# Patient Record
Sex: MALE | Race: WHITE | NOT HISPANIC OR LATINO | URBAN - METROPOLITAN AREA
[De-identification: names, ages, dates, MRNs, and addresses within clinical notes are randomized per-mention and may not be internally consistent; named-entity substitution may affect disease eponyms.]

---

## 2017-01-01 ENCOUNTER — TRANSFERRED RECORDS (OUTPATIENT)
Dept: HEALTH INFORMATION MANAGEMENT | Facility: CLINIC | Age: 62
End: 2017-01-01

## 2017-01-01 ENCOUNTER — HOSPITAL ENCOUNTER (OUTPATIENT)
Dept: SPEECH THERAPY | Facility: CLINIC | Age: 62
Setting detail: THERAPIES SERIES
End: 2017-02-14
Attending: PSYCHIATRY & NEUROLOGY
Payer: MEDICARE

## 2017-01-01 ENCOUNTER — HOSPITAL ENCOUNTER (OUTPATIENT)
Dept: GENERAL RADIOLOGY | Facility: CLINIC | Age: 62
Discharge: HOME | End: 2017-02-14
Attending: PSYCHIATRY & NEUROLOGY | Admitting: PSYCHIATRY & NEUROLOGY
Payer: MEDICARE

## 2017-01-01 ENCOUNTER — TELEPHONE (OUTPATIENT)
Dept: FAMILY MEDICINE | Facility: CLINIC | Age: 62
End: 2017-01-01

## 2017-01-01 ENCOUNTER — OFFICE VISIT (OUTPATIENT)
Dept: FAMILY MEDICINE | Facility: CLINIC | Age: 62
End: 2017-01-01
Payer: COMMERCIAL

## 2017-01-01 ENCOUNTER — OFFICE VISIT (OUTPATIENT)
Dept: NEUROLOGY | Facility: CLINIC | Age: 62
End: 2017-01-01
Payer: COMMERCIAL

## 2017-01-01 VITALS
OXYGEN SATURATION: 92 % | DIASTOLIC BLOOD PRESSURE: 60 MMHG | HEART RATE: 89 BPM | BODY MASS INDEX: 32.51 KG/M2 | RESPIRATION RATE: 16 BRPM | TEMPERATURE: 96.6 F | WEIGHT: 233 LBS | SYSTOLIC BLOOD PRESSURE: 102 MMHG

## 2017-01-01 VITALS
HEART RATE: 106 BPM | SYSTOLIC BLOOD PRESSURE: 123 MMHG | WEIGHT: 228 LBS | DIASTOLIC BLOOD PRESSURE: 66 MMHG | HEIGHT: 71 IN | BODY MASS INDEX: 31.92 KG/M2

## 2017-01-01 DIAGNOSIS — Z85.118 HISTORY OF LUNG CANCER: ICD-10-CM

## 2017-01-01 DIAGNOSIS — E11.3393 TYPE 2 DIABETES MELLITUS WITH BOTH EYES AFFECTED BY MODERATE NONPROLIFERATIVE RETINOPATHY WITHOUT MACULAR EDEMA, WITHOUT LONG-TERM CURRENT USE OF INSULIN (H): ICD-10-CM

## 2017-01-01 DIAGNOSIS — G47.01 INSOMNIA DUE TO MEDICAL CONDITION: ICD-10-CM

## 2017-01-01 DIAGNOSIS — G70.80: ICD-10-CM

## 2017-01-01 DIAGNOSIS — N18.1 CKD (CHRONIC KIDNEY DISEASE) STAGE 1, GFR 90 ML/MIN OR GREATER: ICD-10-CM

## 2017-01-01 DIAGNOSIS — R13.10 DYSPHAGIA, UNSPECIFIED TYPE: ICD-10-CM

## 2017-01-01 DIAGNOSIS — G70.01 MYASTHENIA GRAVIS WITH EXACERBATION (H): Primary | ICD-10-CM

## 2017-01-01 DIAGNOSIS — G70.80: Primary | ICD-10-CM

## 2017-01-01 DIAGNOSIS — E11.42 DIABETIC PERIPHERAL NEUROPATHY (H): ICD-10-CM

## 2017-01-01 DIAGNOSIS — R91.8 HILAR MASS: ICD-10-CM

## 2017-01-01 DIAGNOSIS — J34.89 NASAL DRAINAGE: Primary | ICD-10-CM

## 2017-01-01 DIAGNOSIS — R80.9 MICROALBUMINURIA: ICD-10-CM

## 2017-01-01 DIAGNOSIS — E11.29 TYPE 2 DIABETES, CONTROLLED, WITH RENAL MANIFESTATION (H): ICD-10-CM

## 2017-01-01 DIAGNOSIS — R80.9 PROTEINURIA: ICD-10-CM

## 2017-01-01 DIAGNOSIS — G70.80 LAMBERT-EATON MYASTHENIC SYNDROME (H): Primary | ICD-10-CM

## 2017-01-01 DIAGNOSIS — C34.91 NON-SMALL CELL CANCER OF RIGHT LUNG (H): Primary | ICD-10-CM

## 2017-01-01 LAB — HBA1C MFR BLD: 6.2 % (ref 4.3–6)

## 2017-01-01 PROCEDURE — 40000211 ZZHC STATISTIC SLP  DEPARTMENT VISIT: Performed by: SPEECH-LANGUAGE PATHOLOGIST

## 2017-01-01 PROCEDURE — 83036 HEMOGLOBIN GLYCOSYLATED A1C: CPT | Performed by: INTERNAL MEDICINE

## 2017-01-01 PROCEDURE — G8997 SWALLOW GOAL STATUS: HCPCS | Mod: GN,CM | Performed by: SPEECH-LANGUAGE PATHOLOGIST

## 2017-01-01 PROCEDURE — 99205 OFFICE O/P NEW HI 60 MIN: CPT | Performed by: PSYCHIATRY & NEUROLOGY

## 2017-01-01 PROCEDURE — 92611 MOTION FLUOROSCOPY/SWALLOW: CPT | Mod: GN | Performed by: SPEECH-LANGUAGE PATHOLOGIST

## 2017-01-01 PROCEDURE — G8996 SWALLOW CURRENT STATUS: HCPCS | Mod: GN,CM | Performed by: SPEECH-LANGUAGE PATHOLOGIST

## 2017-01-01 PROCEDURE — 92526 ORAL FUNCTION THERAPY: CPT | Mod: GN | Performed by: SPEECH-LANGUAGE PATHOLOGIST

## 2017-01-01 PROCEDURE — 36415 COLL VENOUS BLD VENIPUNCTURE: CPT | Performed by: INTERNAL MEDICINE

## 2017-01-01 PROCEDURE — G8998 SWALLOW D/C STATUS: HCPCS | Mod: GN,CM | Performed by: SPEECH-LANGUAGE PATHOLOGIST

## 2017-01-01 PROCEDURE — 99214 OFFICE O/P EST MOD 30 MIN: CPT | Performed by: INTERNAL MEDICINE

## 2017-01-01 RX ORDER — DIPHENHYDRAMINE HCL 25 MG
25-50 TABLET ORAL EVERY 6 HOURS PRN
Qty: 120 TABLET | Refills: 1 | Status: SHIPPED | OUTPATIENT
Start: 2017-01-01

## 2017-01-01 RX ORDER — PYRIDOSTIGMINE BROMIDE 60 MG/1
TABLET ORAL
Qty: 30 TABLET | Refills: 1 | OUTPATIENT
Start: 2017-01-01

## 2017-01-01 RX ORDER — PYRIDOSTIGMINE BROMIDE 60 MG/1
TABLET ORAL
Qty: 90 TABLET | Refills: 0 | Status: SHIPPED | OUTPATIENT
Start: 2017-01-01 | End: 2017-01-01

## 2017-01-01 RX ORDER — ZOLPIDEM TARTRATE 5 MG/1
5 TABLET ORAL
Qty: 30 TABLET | Refills: 5 | Status: SHIPPED | OUTPATIENT
Start: 2017-01-01

## 2017-01-01 RX ORDER — PYRIDOSTIGMINE BROMIDE 60 MG/1
60 TABLET ORAL 3 TIMES DAILY
Qty: 90 TABLET | Refills: 0 | Status: SHIPPED | OUTPATIENT
Start: 2017-01-01 | End: 2017-01-01

## 2017-01-01 RX ORDER — PYRIDOSTIGMINE BROMIDE 60 MG/1
TABLET ORAL
Qty: 90 TABLET | Refills: 0 | Status: SHIPPED | OUTPATIENT
Start: 2017-01-01

## 2017-01-01 RX ORDER — LISINOPRIL 2.5 MG/1
2.5 TABLET ORAL DAILY
Qty: 90 TABLET | Refills: 1 | Status: SHIPPED | OUTPATIENT
Start: 2017-01-01

## 2017-01-01 RX ORDER — PYRIDOSTIGMINE BROMIDE 60 MG/1
TABLET ORAL
Qty: 90 TABLET | Refills: 0 | OUTPATIENT
Start: 2017-01-01

## 2017-01-01 ASSESSMENT — PAIN SCALES - GENERAL: PAINLEVEL: NO PAIN (0)

## 2017-01-05 NOTE — TELEPHONE ENCOUNTER
Pyridostigmine 60mg      Last Written Prescription Date:  ?  Last Fill Quantity: ?,   # refills: ?  Last Office Visit with [facility], [facility] or  Health prescribing provider: 04/19/2016  Future Office visit:       Routing refill request to provider for review/approval because:  Drug not on the [facility], [facility] or  [employer] refill protocol or controlled substance    Silvina Geiger MA

## 2017-01-06 NOTE — TELEPHONE ENCOUNTER
Per [place] ([place]) patient was not seen in the ER or inpatient.  Patient was seen at [place] in December for a radiology appointment only that was ordered by [facility].  All [facility] records are scanned into Epic.  Halina Preciado,

## 2017-01-06 NOTE — TELEPHONE ENCOUNTER
Thank you for the update  . Will discuss all issues at patients appointment this Monday     Mandeep Pardo MD

## 2017-01-06 NOTE — TELEPHONE ENCOUNTER
Reason for Call:  Other appointment    Detailed comments: Patient's sister Adela states patient is suffering from myasthenia gravis and diabetes. Adela also states patient was treated @ [facility] via ER and was hospitalized from 12/10/2016 and discharged 12/14/2016. Adela states patient was prescribed Pyridostigmine 60mg however patient has run out of the medication and called [place] for a refill but was instructed to call his primary provider instead so patientlater requested the medication at  but it hasn't been filled. Adela states patient in incapable of explaining his condition fully but it is grave.  Patient has an upcoming appointment scheduled for 01/09/2017 however Adela is concerned that the medication controls patient's ability to swallow and without it he cannot.  A Adela further states patient has been out of meds for almost 24 hours.  Per Adela would like for patient to be added to provider's earliest schedule if possible and have the medication refilled otherwise patient may go into diabetic shock and become incapable of swallowing.    Phone Number Patient can be reached at: Other phone number:  [phone]    Best Time: ASAP    Can we leave a detailed message on this number? YES    Call taken on 1/5/2017 at 6:06 PM by Juana Griffith

## 2017-01-06 NOTE — TELEPHONE ENCOUNTER
Duplicate. Already filled through sperate encounter from 1/5/17    Hedy Wang RN   [facility]- Float Nurse/AntiCoag

## 2017-01-06 NOTE — TELEPHONE ENCOUNTER
I was sent this telephone message as an urgent message. I called and spoke with Adela for a solid 20 minutes. Her brother Al Mirza is in a grave condition it sounds. He was at [facility] and then [facility] ?     [facility] and [facility] discharge summaries / documentation will need to be obtained as soon as possible     I do not understand the full extent of things. 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 Also depression has really emerged and ; we are to consider medication .    The upshot of all of this discussion is    1. Patient does not have critically unstable signs or symptoms  And they don't want to call 911 at this point. Patient refuses re-hospitalization.   2. They urgently want me to send in a refill for the Mestinon (Pyridostigmine) and lisinopril and I did this . He's otherwise current with the prescriptions   3 . We will see patient at his appointment on Monday 1//8/2017.  4. If the situation is unstable sister agrees to call 911 / emergency medical personnel.    Mandeep Pardo MD

## 2017-01-09 NOTE — MR AVS SNAPSHOT
After Visit Summary   1/9/2017    Al Mirza    MRN: [id number]           Patient Information     Date Of Birth          1955        Visit Information        Provider Department      1/9/2017 1:50 PM Mandeep Pardo MD [facility]        Today's Diagnoses     Eaton-Lambert myasthenic syndrome (H)    -  1     Hilar mass         Insomnia due to medical condition         History of lung cancer         Type 2 diabetes mellitus with both eyes affected by moderate nonproliferative retinopathy without macular edema, without long-term current use of insulin           Care Instructions    [facility]    If you have any questions regarding to your visit please contact your care team:     Team Pink:   Clinic Hours Telephone Number   Internal Medicine:  Dr. Sweetie Alejo NP       7am-7pm  Monday - Thursday   7am-5pm  Fridays  [phone]  (Appointment scheduling available 24/7)    Questions about your visit?  Team Line  [phone]   Urgent Care - [place] - 11am-9pm Monday-Friday Saturday-Sunday- 9am-5pm   [place] - 5pm-9pm Monday-Friday Saturday-Sunday- 9am-5pm  [phone] - [place]   [phone] - [place]       What options do I have for visits at the clinic other than the traditional office visit?  To expand how we care for you, many of our providers are utilizing electronic visits (e-visits) and telephone visits, when medically appropriate, for interactions with their patients rather than a visit in the clinic.   We also offer nurse visits for many medical concerns. Just like any other service, we will bill your insurance company for this type of visit based on time spent on the phone with your provider. Not all insurance companies cover these visits. Please check with your medical insurance if this type of visit is covered. You will be responsible for any charges that are not paid by your  insurance.      E-visits via [employer]:  generally incur a $35.00 fee.  Telephone visits:  Time spent on the phone: *charged based on time that is spent on the phone in increments of 10 minutes. Estimated cost:   5-10 mins $30.00   11-20 mins. $59.00   21-30 mins. $85.00   Use [employer] (secure email communication and access to your chart) to send your primary care provider a message or make an appointment. Ask someone on your Team how to sign up for [employer].    For a Price Quote for your services, please call our [employer] Line at [phone].    As always, Thank you for trusting us with your health care needs!    Discharged by Katya MARTIN CMA ([facility])          Follow-ups after your visit        Additional Services     NEUROLOGY ADULT REFERRAL       Your provider has referred you to: [facility]: Sonal Webster [phone]   [email or web address]    Reason for Referral: Consult    Please be aware that coverage of these services is subject to the terms and limitations of your health insurance plan.  Call member services at your health plan with any benefit or coverage questions.      Please bring the following with you to your appointment:    (1) Any X-Rays, CTs or MRIs which have been performed.  Contact the facility where they were done to arrange for  prior to your scheduled appointment.    (2) List of current medications  (3) This referral request   (4) Any documents/labs given to you for this referral                  Who to contact     If you have questions or need follow up information about today's clinic visit or your schedule please contact [facility] directly at [phone].  Normal or non-critical lab and imaging results will be communicated to you by MyChart, letter or phone within 4 business days after the clinic has received the results. If you do not hear from us within 7 days, please contact the clinic through MyChart or phone. If you have a  "critical or abnormal lab result, we will notify you by phone as soon as possible.  Submit refill requests through [employer] or call your pharmacy and they will forward the refill request to us. Please allow 3 business days for your refill to be completed.          Additional Information About Your Visit        [employer] Information     [employer] lets you send messages to your doctor, view your test results, renew your prescriptions, schedule appointments and more. To sign up, go to [email or web address] . Click on \"Log in\" on the left side of the screen, which will take you to the Welcome page. Then click on \"Sign up Now\" on the right side of the page.     You will be asked to enter the access code listed below, as well as some personal information. Please follow the directions to create your username and password.     Your access code is: [email or web address]  Expires: 2017  2:37 PM     Your access code will  in 90 days. If you need help or a new code, please call your [place] clinic or [phone].        Care EveryWhere ID     This is your Care EveryWhere ID. This could be used by other organizations to access your [place] medical records  [id number]        Your Vitals Were     Pulse Temperature Respirations Pulse Oximetry          89 96.6  F (35.9  C) (Oral) 16 92%         Blood Pressure from Last 3 Encounters:   17 102/60   16 128/82   07/27/15 136/77    Weight from Last 3 Encounters:   17 233 lb (105.688 kg)   16 248 lb 3.2 oz (112.583 kg)   07/27/15 255 lb (115.667 kg)              We Performed the Following     Hemoglobin A1c     NEUROLOGY ADULT REFERRAL          Today's Medication Changes          These changes are accurate as of: 17  2:37 PM.  If you have any questions, ask your nurse or doctor.               Start taking these medicines.        Dose/Directions    zolpidem 5 MG tablet   Commonly known as:  AMBIEN   Used for:  Insomnia due to medical condition   Started by:  " Mandeep Pardo MD        Dose:  5 mg   Take 1 tablet (5 mg) by mouth nightly as needed for sleep   Quantity:  30 tablet   Refills:  5            Where to get your medicines      Some of these will need a paper prescription and others can be bought over the counter.  Ask your nurse if you have questions.     Bring a paper prescription for each of these medications    - zolpidem 5 MG tablet             Primary Care Provider Office Phone # Fax #    Mandeep Pardo [phone]       [address] [address]        Thank you!     Thank you for choosing [facility]  for your care. Our goal is always to provide you with excellent care. Hearing back from our patients is one way we can continue to improve our services. Please take a few minutes to complete the written survey that you may receive in the mail after your visit with us. Thank you!             Your Updated Medication List - Protect others around you: Learn how to safely use, store and throw away your medicines at www.disposemymeds.org.          This list is accurate as of: 1/9/17  2:37 PM.  Always use your most recent med list.                   Brand Name Dispense Instructions for use    albuterol 108 (90 BASE) MCG/ACT Inhaler    PROAIR HFA/PROVENTIL HFA/VENTOLIN HFA     Inhale 2 puffs into the lungs every 6 hours       aspirin 81 MG tablet     1 tablet    Take 1 tablet by mouth daily.       blood glucose calibration solution     1 Bottle    1 Bottle by In Vitro route See Admin Instructions       blood glucose monitoring lancets     102 each    Check home blood glucose monitering 2 times a day       blood glucose monitoring test strip    no brand specified    3 Month    Check three times a day home blood glucose monitoring. Due to fluctuating blood glucose readings , and some hypoglycemia       budesonide-formoterol 80-4.5 MCG/ACT Inhaler    SYMBICORT     Inhale 2 puffs into the lungs 2 times  daily       CORICIDIN HBP COUGH/COLD PO      Take 1 tablet by mouth daily       cyanocobalamin 1000 MCG tablet    vitamin  B-12     1 tablet daily       glipiZIDE 10 MG tablet    GLUCOTROL    180 tablet    Medication is being filled for 1 time refill only due to:  Patient needs to be seen because needs recheck.       lisinopril 2.5 MG tablet    PRINIVIL/Zestril    90 tablet    Take 1 tablet (2.5 mg) by mouth daily       metFORMIN 1000 MG tablet    GLUCOPHAGE    180 tablet    TAKE ONE TABLET BY MOUTH TWICE DAILY WITH MEALS       order for DME     1 Device    Equipment being ordered: Adjustable Lumbar support/brace for chronic low back pain       oxybutynin 10 MG 24 hr tablet    DITROPAN-XL    30 tablet    Take 1 tablet (10 mg) by mouth daily       oxyCODONE-acetaminophen 5-325 MG per tablet    PERCOCET    45 tablet    Take 1 tablet by mouth every 6 hours as needed       pyridostigmine 60 MG tablet    MESTINON    90 tablet    Take 1 tablet (60 mg) by mouth 3 times daily       simvastatin 40 MG tablet    ZOCOR    90 tablet    Take 1 tablet (40 mg) by mouth At Bedtime       vitamin D 2000 UNITS Caps          zolpidem 5 MG tablet    AMBIEN    30 tablet    Take 1 tablet (5 mg) by mouth nightly as needed for sleep

## 2017-01-09 NOTE — PROGRESS NOTES
"  SUBJECTIVE:                                                    Al Mirza is a 61 year old male who presents to clinic today for the following health issues:       Eaton-Lambert myasthenic syndrome (H)  Hilar mass  Insomnia due to medical condition  History of lung cancer  Type 2 diabetes mellitus with both eyes affected by moderate nonproliferative retinopathy without macular edema, without long-term current use of insulin     Diabetes Follow-up      Patient is checking blood sugars: not at all    Diabetic concerns: None     Symptoms of hypoglycemia (low blood sugar): dizzy, weak, lethargy, dry mouth     Paresthesias (numbness or burning in feet) or sores: No     Date of last diabetic eye exam: 6 month       Amount of exercise or physical activity: None    Problems taking medications regularly: No    Medication side effects: none    Diet: regular (no restrictions)    Problem list and histories reviewed & adjusted, as indicated.  Additional history: as documented    Patient Active Problem List   Diagnosis     Neuropathy in diabetes (H)     obesity     Ex-smoker     HYPERLIPIDEMIA LDL GOAL <100     History of lung cancer     Vitamin B12 deficiency     Microalbuminuria     Insomnia     Episodic lightheadedness     CKD (chronic kidney disease) stage 1, GFR 90 ml/min or greater     HDL lipoprotein deficiency     Hypertriglyceridemia     Esophageal thickening     Hypovitaminosis D     Other emphysema (H)     Type 2 diabetes mellitus with moderate nonproliferative diabetic retinopathy and without macular edema (H)     History reviewed. No pertinent past surgical history.    Social History   Substance Use Topics     Smoking status: Former Smoker -- 10 years     Types: Cigarettes     Quit date: 08/04/2012     Smokeless tobacco: Never Used      Comment: Quit \"about 5-6 months ago\" (7/27/15)     Alcohol Use: Yes      Comment: very seldom     Family History   Problem Relation Age of Onset     DIABETES Brother      " DIABETES Brother          Current Outpatient Prescriptions   Medication Sig Dispense Refill     zolpidem (AMBIEN) 5 MG tablet Take 1 tablet (5 mg) by mouth nightly as needed for sleep 30 tablet 5     pyridostigmine (MESTINON) 60 MG tablet Take 1 tablet (60 mg) by mouth 3 times daily 90 tablet 0     lisinopril (PRINIVIL/ZESTRIL) 2.5 MG tablet Take 1 tablet (2.5 mg) by mouth daily 90 tablet 1     metFORMIN (GLUCOPHAGE) 1000 MG tablet TAKE ONE TABLET BY MOUTH TWICE DAILY WITH MEALS 180 tablet 0     glipiZIDE (GLUCOTROL) 10 MG tablet Medication is being filled for 1 time refill only due to:  Patient needs to be seen because needs recheck. 180 tablet 5     simvastatin (ZOCOR) 40 MG tablet Take 1 tablet (40 mg) by mouth At Bedtime 90 tablet 1     order for DME Equipment being ordered: Adjustable Lumbar support/brace for chronic low back pain 1 Device 0     oxybutynin (DITROPAN-XL) 10 MG 24 hr tablet Take 1 tablet (10 mg) by mouth daily 30 tablet 1     albuterol (PROAIR HFA, PROVENTIL HFA, VENTOLIN HFA) 108 (90 BASE) MCG/ACT inhaler Inhale 2 puffs into the lungs every 6 hours       budesonide-formoterol (SYMBICORT) 80-4.5 MCG/ACT inhaler Inhale 2 puffs into the lungs 2 times daily       blood glucose test strip Check three times a day home blood glucose monitoring. Due to fluctuating blood glucose readings , and some hypoglycemia 3 Month 3     Cholecalciferol (VITAMIN D) 2000 UNITS CAPS        Blood Glucose Calibration (ACCU-CHEK ABHISHEK) SOLN 1 Bottle by In Vitro route See Admin Instructions 1 Bottle 3     ACCU-CHEK MULTICLIX LANCETS MISC Check home blood glucose monitering 2 times a day 102 each 11     aspirin 81 MG tablet Take 1 tablet by mouth daily. 1 tablet 0     VITAMIN B-12 1000 MCG OR TABS 1 tablet daily       oxyCODONE-acetaminophen (PERCOCET) 5-325 MG per tablet Take 1 tablet by mouth every 6 hours as needed 45 tablet 0     Chlorpheniramine-DM (CORICIDIN HBP COUGH/COLD PO) Take 1 tablet by mouth daily       No  Known Allergies  Recent Labs   Lab Test  01/09/17   1353  04/19/16   1021  07/27/15   1455   01/08/15   1056  04/29/14   1702  08/27/13   1149  12/04/12   0940  07/03/12   1155   A1C  6.2*  7.0*  7.1*   --   7.0*  7.1*  8.6*  7.4*  9.3*   LDL   --   76   --    --   83  120  Cannot estimate LDL when triglyceride exceeds 400 mg/dL  124  57  Cannot estimate LDL when triglyceride exceeds 400 mg/dL  89   HDL   --    --    --    --   30*  25*  28*  24*  27*   TRIG   --    --    --    --   171*   --   451*  348*  404*   ALT   --    --    --    --    --   20   --   28  10   CR   --   0.92  0.96   < >   --   0.79  0.89  0.82  0.71   GFRESTIMATED   --   83  79   < >   --   >90  88  >90  >90   GFRESTBLACK   --   >90   GFR Calc    >90   GFR Calc     < >   --   >90  >90  >90  >90   POTASSIUM   --   4.8  4.6   --    --   4.2  4.8  4.1  4.2   TSH   --    --   1.30   --    --    --    --    --   1.04    < > = values in this interval not displayed.      BP Readings from Last 3 Encounters:   01/09/17 102/60   04/19/16 128/82   07/27/15 136/77    Wt Readings from Last 3 Encounters:   01/09/17 233 lb (105.688 kg)   04/19/16 248 lb 3.2 oz (112.583 kg)   07/27/15 255 lb (115.667 kg)                  Labs reviewed in EPIC  Problem list, Medication list, Allergies, and Medical/Social/Surgical histories reviewed in [facility] and updated as appropriate.    ROS:  Constitutional, HEENT, cardiovascular, pulmonary, gi and gu systems are negative, except as otherwise noted.    OBJECTIVE:                                                    /60 mmHg  Pulse 89  Temp(Src) 96.6  F (35.9  C) (Oral)  Resp 16  Wt 233 lb (105.688 kg)  SpO2 92%  Body mass index is 32.51 kg/(m^2).  GENERAL APPEARANCE: alert and mild distress of an emotional nature   EYES: Eyes grossly normal to inspection, PERRL and conjunctivae and sclerae normal  HEENT: during our time of talking today , his voice went from being initially normal to  "becoming more and more difficult and the words were formed more slowly and went at a few points to slurred and he indicated frustrations with what he calls :\" mouth fatigue\".  NEURO: Normal strength and tone, mentation intact and speech normal  PSYCH: mentation appears normal, affect flat and concentration poor    Diagnostic test results:  Diagnostic Test Results:  Orders Placed This Encounter   Procedures     Hemoglobin A1c     NEUROLOGY ADULT REFERRAL          ASSESSMENT/PLAN:                                                    1. Eaton-Lambert myasthenic syndrome (H)  This is an amazingly complicated situation . Both Myasthenia Gravis and Lambert-Eaton Myasthenic Syndrome are quite uncommon to run across during usual outpatient clinic practice. I reviewed quite a bit of inpatient documentation including the neurology evaluation and this patient has been treated with both intravenous immunoglobulin therapy [ IVIG ] and prednisone as well as with Mestinon (Pyridostigmine). He's just not really endorsing the idea that anything has helped much although he does admit there was a short time while inpatient that his symptoms were better and this seems to correlate with intravenous immunoglobulin therapy [ IVIG ] and prednisone. He had a paraneoplastic antibody panel drawn which is available on care everywhere. This strongly identifies Lambert-Eaton Myasthenic Syndrome as the more likely diagnosis. At this point , beyond talking Mestinon (Pyridostigmine) , he has already undergone a lung biopsy with [facility] and we see the pathology report on this also in care everywhere. This appears to be a squamous cell cancer and not a Small cell lung cancer which is what this patient had 5-6 years ago. If this is true it would mean this is most likely a second primary lung cancer, whether or not this is stage IV is yet to be determined by Dr. Uribe and more importantly , whether or not there is treatment for this. In general " it is emphasized in multiple sources that the absolutely more important thing with Lambert-Eaton Myasthenic Syndrome is to treat the underlying malignancy. There was really nothing more I could do today beyond arrange outpatient neurological consultation as patient absolutely does not want to go backl to [facility] with [facility] for any reason !  - NEUROLOGY ADULT REFERRAL    2. Hilar benjie  Mitchell and further management is per Dr. Alice Uribe with [facility]     3. Insomnia due to medical condition  He is frustrated and fearful and I agreed to give some short term zolpidem [ Ambien ] but I need to see him back in 1 month , and we would consider antidepressants  - zolpidem (AMBIEN) 5 MG tablet; Take 1 tablet (5 mg) by mouth nightly as needed for sleep  Dispense: 30 tablet; Refill: 5    4. History of lung cancer  Noted as a point of historical importance     5. Type 2 diabetes mellitus with both eyes affected by moderate nonproliferative retinopathy without macular edema, without long-term current use of insulin  A1C      6.2   1/9/2017  A1C      7.0   4/19/2016  A1C      7.1   7/27/2015  A1C      7.0   1/8/2015  A1C      7.1   4/29/2014    Basic this is a non-issue right now.  - Hemoglobin A1c      Follow up with Provider - 1 month      Mandeep Pardo MD  [facility]        (G70.80) Eaton-Lambert myasthenic syndrome (H)  (primary encounter diagnosis)  Comment: Myasthenia gravis is an autoimmune disease in which antibodies produced by the body attack the receptors responsible for binding neurotransmitters, brain chemicals, on the muscles side of the synapse, the space between the end of a nerve cell and the muscle cell. Specifically, the neurotransmitter, acetylcholine, normally transmits impulses between nerves and muscles. In myasthenia gravis, acetylcholine is blocked by antibodies to its receptor.    Lambert-Eaton Syndrome, also called myasthenic syndrome, is a disorder with symptoms  very similar to those of myasthenia gravis. In Lambert-Eaton syndrome, the interruption in muscle-nerve communication is caused by an insufficient release of neurotransmitter by the nerve cell.    In myasthenia gravis, initial weakness involved extraocular muscles in 59%, bulbar muscles in 29%, and limb muscles in 12% of the patients. In LEMS no patient had ocular weakness, 5% had bulbar weakness, and 95% had weakness of the limbs as the first symptom (p < 0.001). At the point of maximum severity, weakness in myasthenia gravis was purely ocular in 25%, oculobulbar in 5%, restricted to the limbs in 2%, and present in both oculobulbar muscles and limbs in 68%. At this point, none of the LEMS patients had weakness restricted to extraocular or bulbar muscles (p = 0.002). The legs were affected in all LEMS patients, whereas in 12 patients with generalised myasthenia gravis limb weakness was restricted to the arms (p = 0.024).    CONCLUSIONS:     In a patient suspected to have a myasthenic syndrome whose first symptom is ocular weakness, LEMS is virtually excluded. Limb weakness confined to the arms is only found in generalised myasthenia gravis and not in LEMS. Muscle weakness in myasthenia gravis tends to develop in a craniocaudal direction, and in the opposite direction in LEMS.    Antibody Assays       Voltage-gated calcium channel antibodies     Antibodies to voltage-gated calcium channels (VGCCs) have been reported in % of LEMS patients who have small cell lung cancer (SCLC) and in 50-90% of LEMS patients who do not have underlying cancer.       They are also found in fewer than 5% of patients with myasthenia gravis (MG), in up to 25% of patients with lung cancer without LEMS, and in some patients who do not have LEMS but have high levels of circulating immunoglobulins (eg, those with systemic lupus erythematosus or rheumatoid arthritis).    Acetylcholine receptor antibodies     ACh receptor (AChR) antibodies  are most commonly associated with myasthenia gravis (MG) and are occasionally found in low titers in LEMS. The only true methods of differentiating MG from LEMS are the detection of AChR antibodies and the presence of underlying malignancy.    Approach Considerations        Individually tailor therapy for Lambert-Eaton myasthenic syndrome (LEMS) on the basis of severity of weakness, underlying disease(s), life expectancy, and response to previous treatment. Therapy is best coordinated with the primary care physician and appropriate consultants.     If an underlying neoplasm is present (eg, small cell lung cancer [SCLC]), initial treatment should be aimed at the neoplasm because weakness frequently improves with effective cancer therapy. No further LEMS treatment may be necessary in some patients. Typical treatments for patients with SCLC as the cause of their LEMS would include combination therapy with cisplatin and etoposide. Through both tumor modulation and its direct immunosuppressive properties, chemotherapy does seem to improve the symptoms of LEMS.        In patients with LEMS who do not have cancer, aggressive immunotherapy should be considered.    Treatment of Underlying Malignancy        In patients with cancer, LEMS is usually not the major therapeutic concern: the primary concern is the cancer. Accordingly, when the diagnosis of LEMS is confirmed, perform an extensive search for an underlying malignancy with radiography and computed tomography (CT) of the chest, bronchoscopy, and possibly positron emission tomography (PET) scanning.       If no tumor is found, periodically search again for occult malignancy. Frequency of these evaluations is determined by the patient s risk of cancer.       Patients younger than 50 years without history of long-term smoking have a low risk of associated malignancy, especially if evidence of coexisting autoimmune disease is present. Extensive surveillance for cancer may  not be necessary for such patients. Patients older than 50 years with a history of long-term smoking almost certainly have underlying SCLC.       Initial treatment should be aimed at the neoplasm because weakness frequently improves with effective cancer therapy. No further LEMS treatment may be necessary in some patients.

## 2017-01-09 NOTE — NURSING NOTE
"Chief Complaint   Patient presents with     Recheck Medication     Diabetes     Results       Initial /60 mmHg  Pulse 89  Temp(Src) 96.6  F (35.9  C) (Oral)  Resp 16  Wt 233 lb (105.688 kg)  SpO2 92% Estimated body mass index is 32.51 kg/(m^2) as calculated from the following:    Height as of 4/19/16: 5' 11\" (1.803 m).    Weight as of this encounter: 233 lb (105.688 kg).  BP completed using cuff size: shiela Nuñez CMA      "

## 2017-01-09 NOTE — PATIENT INSTRUCTIONS
[facility]    If you have any questions regarding to your visit please contact your care team:     Team Pink:   Clinic Hours Telephone Number   Internal Medicine:  Dr. Sweetie Alejo NP       7am-7pm  Monday - Thursday   7am-5pm  Fridays  [phone]  (Appointment scheduling available 24/7)    Questions about your visit?  Team Line  [phone]   Urgent Care - [place] and [facility] - 11am-9pm Monday-Friday Saturday-Sunday- 9am-5pm   [place] - 5pm-9pm Monday-Friday Saturday-Sunday- 9am-5pm  [phone] - [place]   [phone] - [place]       What options do I have for visits at the clinic other than the traditional office visit?  To expand how we care for you, many of our providers are utilizing electronic visits (e-visits) and telephone visits, when medically appropriate, for interactions with their patients rather than a visit in the clinic.   We also offer nurse visits for many medical concerns. Just like any other service, we will bill your insurance company for this type of visit based on time spent on the phone with your provider. Not all insurance companies cover these visits. Please check with your medical insurance if this type of visit is covered. You will be responsible for any charges that are not paid by your insurance.      E-visits via [email or web address]:  generally incur a $35.00 fee.  Telephone visits:  Time spent on the phone: *charged based on time that is spent on the phone in increments of 10 minutes. Estimated cost:   5-10 mins $30.00   11-20 mins. $59.00   21-30 mins. $85.00   Use [employer] (secure email communication and access to your chart) to send your primary care provider a message or make an appointment. Ask someone on your Team how to sign up for [email or web address].    For a Price Quote for your services, please call our Consumer Price Line at [phone].    As always, Thank you for trusting us with your health care  needs!    Discharged by Katya MARTIN CMA ([facility])

## 2017-01-16 NOTE — TELEPHONE ENCOUNTER
Patient was recently prescribed pyridostigmine 60MG and is feeling worse. Would like a call back as soon as possible to discuss further.     Please advise.    Thank you.    Mallorie BERNARD

## 2017-01-16 NOTE — TELEPHONE ENCOUNTER
"Spoke with patient.  Correction to the message below.  He stated this has nothing to do with his breathing but rather his \"eaton\"  Patient was seen for Eaton-Lambert myasthenic syndrome last week    1. He does not feel that the pyridostigmine (MESTINON) 60 MG tablet is helpful. Feels symptoms are worsening    2. Also complains of a Runny nose x 1 month making it difficult to use his oxygen.  Denies any known allergies.  Wondering if he can be prescribed something for his runny nose.    Routing to Dr. Pardo to please advise    Call patient back with updates    Hayley Gatica RN      "

## 2017-01-16 NOTE — TELEPHONE ENCOUNTER
"We discussed in detail his current situation. He's frustrated     1. Persistent mouth weakness and trouble swallowing, troubles speaking , can't eat at this point   2. Compounding his misery is that he's got profuse nasal discharge and we \" have to help him\"  3. He has an appointment pending with Dr. Alice Uribe with [facility] on Wednesday but feels this wasnt soon enough  4. He has an appointment with Dr. Alexi Dooley at [facility] for the 25th of January and patient feels this wasn't soon enough     We discussed in detail his cause.    A] will send a prescription for diphenhydrAMINE (BENADRYL) to his pharmacy to try for the nasal drainage   B] will send a heads up to Dr. Dooley looking for a sooner appointment     Mandeep Pardo MD      "

## 2017-01-16 NOTE — TELEPHONE ENCOUNTER
Reason for call:  Patient reporting a symptom    Symptom or request: he would like a call back regarding his breathing.     Duration (how long have symptoms been present):  He has had it for a week now.     Have you been treated for this before? Yes    Additional comments: na    Phone Number patient can be reached at:  Home number on file [phone] (home)    Best Time:  any    Can we leave a detailed message on this number:  YES    Call taken on 1/16/2017 at 12:51 PM by Abbie Finney

## 2017-01-16 NOTE — TELEPHONE ENCOUNTER
"Spoke to patient and informed him that message was sent to provider.  Patient is irritated that he has not had a response yet.   He is concerned because he can't eat and his nose is running.  Informed the patient we would call as soon as we got a response from provider.  Informed patient that another message will be sent to provider.  Patient stated \"I guess I'm not that important then\" when the patient was informed that response may or may not come tonight.  Patient then hung up.     Routing to provider to advise.  Rad Lee RN     "

## 2017-01-16 NOTE — TELEPHONE ENCOUNTER
Reason for Call:  Other returning call    Detailed comments: please call him back.     Phone Number Patient can be reached at: Home number on file [phone] (home)    Best Time: any    Can we leave a detailed message on this number? YES    Call taken on 1/16/2017 at 4:31 PM by Abbie Finney

## 2017-01-17 NOTE — TELEPHONE ENCOUNTER
Called and spoke with patient and offer him a sooner appointment for 1/19/2017 at [facility] at 4pm. Patient declined stating that is too late for him. I then offered him to come to [facility] on Monday at 9am. Patient accepted that appointment but, was a little hestitant because he didn't know if he would be able to find the clinic. I told patient I would mail him out a map as to how to reach the clinic from his home. Patient was satified with that. Map mailed to patient him along with a note attached with date and time of appointment.  Tamanna Shabazz MA

## 2017-01-20 NOTE — TELEPHONE ENCOUNTER
Addendum: It is a possibility that he may need evaluation in ER with need for treatment in ICU.

## 2017-01-20 NOTE — TELEPHONE ENCOUNTER
Please inform patient that current symptoms need symptomatic treatment from his medical team including oncology because of Lung carcinoma. For any urgent situation, he nned to go to ER of a local hospital, preferably [facility] with neurology /neuromuscular specialist's help can be obtained.  ALICIA Shabazz MA is helping him for out-patient neurology appointment.

## 2017-01-20 NOTE — TELEPHONE ENCOUNTER
Please relay Dr. Dooley's note below.  If his symptoms are worsening he needs to be seen in ED.    Wendy Alejo, CNP

## 2017-01-20 NOTE — TELEPHONE ENCOUNTER
Patient notified of Dr. Dooley's message.  He stated that symptoms have actually gotten better today.  He agreed to go to the ED if symptoms get worse again over the weekend.  He will keep his appointment with dr. Dooley for 1/23/17    Hayley Gatica RN

## 2017-01-23 PROBLEM — G70.80: Status: ACTIVE | Noted: 2017-01-01

## 2017-01-23 NOTE — TELEPHONE ENCOUNTER
pyridostigmine (MESTINON) 60 MG tablet      Last Written Prescription Date:  1/5/2017  Last Fill Quantity: 90,   # refills: 0  Last Office Visit with [facility] or [facility] prescribing provider: 1/9/2017  Future Office visit:       Routing refill request to provider for review/approval because:  Drug not on the [facility], [facility] or [facility] refill protocol or controlled substance

## 2017-01-23 NOTE — MR AVS SNAPSHOT
After Visit Summary   1/23/2017    Al Mirza    MRN: [id number]           Patient Information     Date Of Birth          1955        Visit Information        Provider Department      1/23/2017 9:00 AM Alexi Dooley MD [facility]        Today's Diagnoses     Lambert-Eaton myasthenic syndrome (H)    -  1     History of lung cancer         Diabetic peripheral neuropathy (H)         Dysphagia, unspecified type           Care Instructions    AFTER VISIT SUMMARY (AVS)  Orders Placed This Encounter   Procedures     SPEECH THERAPY REFERRAL     NEUROLOGY ADULT REFERRAL     Diagnostic possibilities reviewed    Advised to have regular follow up with Mandeep Pardo MD for coordinating his care.     Encouraged to cut his food into small pieces.     To call us for follow-up appointment on as needed basis    Thanks             Follow-ups after your visit        Additional Services     NEUROLOGY ADULT REFERRAL       Your provider has referred you to: [facility]: Neurology Clinic [facility] [phone]   [email or web address]  Neuromuscular    Reason for Referral: Would appreciate your help with the diagnosis of myasthenia gravis/Eaton-Lambert syndrome. Recently diagnosed to have second lung cancer with previous history of small cell carcinoma (2008). He is waiting to see the surgical oncologist.     Please be aware that coverage of these services is subject to the terms and limitations of your health insurance plan.  Call member services at your health plan with any benefit or coverage questions.      Please bring the following with you to your appointment:    (1) Any X-Rays, CTs or MRIs which have been performed.  Contact the facility where they were done to arrange for  prior to your scheduled appointment.    (2) List of current medications  (3) This referral request   (4) Any documents/labs given to you for this referral            SPEECH  THERAPY REFERRAL       Recently diagnosed to have second time lung cancer and myasthenia gravis vs Eaton-Lambert syndrome. He is waiting to see surgical oncologist and neuromuscular specialist.     *This therapy referral will be filtered to a centralized scheduling office at [facility] and the patient will receive a call to schedule an appointment at a [place] location most convenient for them. *     [facility] provides Speech Therapy evaluation and treatment and many specialty services across the [place] system.  If requesting a specialty program, please choose from the list below.  If you have not heard from the scheduling office within 2 business days, please call [phone] for all locations, with the exception of [place], please call [phone].       Treatment: Evaluation & Treatment  Speech Treatment Diagnosis: Dysphagia  Special Instructions: None  Special Programs: Clinical Swallow Study  Video Swallow Study    Please be aware that coverage of these services is subject to the terms and limitations of your health insurance plan.  Call member services at your health plan with any benefit or coverage questions.      **Note to Provider:  If you are referring outside of [place] for the therapy appointment, please list the name of the location in the  special instructions  above, print the referral and give to the patient to schedule the appointment.                  Follow-up notes from your care team     Return if symptoms worsen or fail to improve.      Who to contact     If you have questions or need follow up information about today's clinic visit or your schedule please contact [facility] [place] directly at [phone].  Normal or non-critical lab and imaging results will be communicated to you by MyChart, letter or phone within 4 business days after the clinic has received the results. If you do not hear from us within 7 days, please contact  "the clinic through [employer] or phone. If you have a critical or abnormal lab result, we will notify you by phone as soon as possible.  Submit refill requests through [employer] or call your pharmacy and they will forward the refill request to us. Please allow 3 business days for your refill to be completed.          Additional Information About Your Visit        [employer] Information     [employer] lets you send messages to your doctor, view your test results, renew your prescriptions, schedule appointments and more. To sign up, go to [email or web address] . Click on \"Log in\" on the left side of the screen, which will take you to the Welcome page. Then click on \"Sign up Now\" on the right side of the page.     You will be asked to enter the access code listed below, as well as some personal information. Please follow the directions to create your username and password.     Your access code is: [email or web address]  Expires: 2017  2:37 PM     Your access code will  in 90 days. If you need help or a new code, please call your [place] clinic or [phone].        Care EveryWhere ID     This is your Care EveryWhere ID. This could be used by other organizations to access your [place] medical records  [id number]        Your Vitals Were     Pulse Height BMI (Body Mass Index)             106 1.803 m (5' 11\") 31.81 kg/m2          Blood Pressure from Last 3 Encounters:   17 123/66   17 102/60   16 128/82    Weight from Last 3 Encounters:   17 103.42 kg (228 lb)   17 105.688 kg (233 lb)   16 112.583 kg (248 lb 3.2 oz)              We Performed the Following     NEUROLOGY ADULT REFERRAL     SPEECH THERAPY REFERRAL        Primary Care Provider Office Phone # Fax #    Mandeep Pardo [phone]       [facility] [address] [address]        Thank you!     Thank you for choosing [facility]  for your care. Our goal is always to " provide you with excellent care. Hearing back from our patients is one way we can continue to improve our services. Please take a few minutes to complete the written survey that you may receive in the mail after your visit with us. Thank you!             Your Updated Medication List - Protect others around you: Learn how to safely use, store and throw away your medicines at www.disposemymeds.org.          This list is accurate as of: 1/23/17 10:38 AM.  Always use your most recent med list.                   Brand Name Dispense Instructions for use    albuterol 108 (90 BASE) MCG/ACT Inhaler    PROAIR HFA/PROVENTIL HFA/VENTOLIN HFA     Inhale 2 puffs into the lungs every 6 hours       aspirin 81 MG tablet     1 tablet    Take 1 tablet by mouth daily.       blood glucose calibration solution     1 Bottle    1 Bottle by In Vitro route See Admin Instructions       blood glucose monitoring lancets     102 each    Check home blood glucose monitering 2 times a day       blood glucose monitoring test strip    no brand specified    3 Month    Check three times a day home blood glucose monitoring. Due to fluctuating blood glucose readings , and some hypoglycemia       budesonide-formoterol 80-4.5 MCG/ACT Inhaler    SYMBICORT     Inhale 2 puffs into the lungs 2 times daily       CORICIDIN HBP COUGH/COLD PO      Take 1 tablet by mouth daily       cyanocobalamin 1000 MCG tablet    vitamin  B-12     1 tablet daily       diphenhydrAMINE 25 MG tablet    BENADRYL    120 tablet    Take 1-2 tablets (25-50 mg) by mouth every 6 hours as needed for itching or allergies       glipiZIDE 10 MG tablet    GLUCOTROL    180 tablet    Medication is being filled for 1 time refill only due to:  Patient needs to be seen because needs recheck.       lisinopril 2.5 MG tablet    PRINIVIL/Zestril    90 tablet    Take 1 tablet (2.5 mg) by mouth daily       metFORMIN 1000 MG tablet    GLUCOPHAGE    180 tablet    TAKE ONE TABLET BY MOUTH TWICE DAILY WITH  MEALS       order for DME     1 Device    Equipment being ordered: Adjustable Lumbar support/brace for chronic low back pain       oxybutynin 10 MG 24 hr tablet    DITROPAN-XL    30 tablet    Take 1 tablet (10 mg) by mouth daily       oxyCODONE-acetaminophen 5-325 MG per tablet    PERCOCET    45 tablet    Take 1 tablet by mouth every 6 hours as needed       pyridostigmine 60 MG tablet    MESTINON    90 tablet    Take 1 tablet (60 mg) by mouth 3 times daily       simvastatin 40 MG tablet    ZOCOR    90 tablet    Take 1 tablet (40 mg) by mouth At Bedtime       vitamin D 2000 UNITS Caps          zolpidem 5 MG tablet    AMBIEN    30 tablet    Take 1 tablet (5 mg) by mouth nightly as needed for sleep

## 2017-01-23 NOTE — PATIENT INSTRUCTIONS
AFTER VISIT SUMMARY (AVS)  Orders Placed This Encounter   Procedures     SPEECH THERAPY REFERRAL     NEUROLOGY ADULT REFERRAL     Diagnostic possibilities reviewed    Advised to have regular follow up with Mandeep Pardo MD for coordinating his care.     Encouraged to cut his food into small pieces.     To call us for follow-up appointment on as needed basis    Thanks

## 2017-01-23 NOTE — PROGRESS NOTES
INITIAL NEUROLOGY CONSULTATION NOTE    LOCATION: [facility]   DATE OF VISIT: January 23, 2017  PRIMARY CARE PROVIDER: Mandeep Pardo MD    REASON FOR VISIT: Concern for Eaton-Lambert myasthenic syndrome    HISTORY OF PRESENT ILLNESS ([place]): Mr. Al Mirza seen at the request of Mandeep Pardo MD. Accompanied by his sister.     62 year old RIGHT-handed  man diagnosed to have (small cell lung cancer) left lung cancer in 2008. The first time his cancer was treated with chemotherapy and radiation therapy. In December 2016 he was taken to the [facility] with symptoms of slurred speech,. Difficulty swallowing, left eye droop, difficulty breathing, productive cough with excessive mucus. He was inpatient for 5 days. He was diagnosed to have pneumonia, myasthenia gravis, and suspicion for lung cancer. He lad lung biopsy on 12/29./2016 and it was indicated to him that he has non small cell carcinoma affecting the upper left lobe of the right lung. He was started on PYRIDOSTIGMINE by neurologist. Recently he saw Dr. Pardo and he feels that he may have Eaton-Lambert myasthenic syndrome.     His oncologist has referred him to a cancer surgeon with a view to find out what options are in relation to his right upper lobe lung carcinoma.     On direct questioning he denies any weakness of the hand  on either side. No difficulty raising his arm above shoulder height or getting up from the chair. His sister relates that he has difficulty swallowing. He himself says that there are times he may have difficulty with swallowing and other times he can swallow. He has not seen a speech therapist for swallowing difficulties. He has history of COPD and has home oxygen for the last 1 year.     On direct questioning, he seems to be uncertain if he has dryness of the mouth or not. There are limitations in getting detailed history as he appears to be  understandably anxious, it is difficult to keep him focused on the details of the history in spite of his sister's best help.     REVIEW OF SYSTEMS: Negative except for the items mentioned in the History of Present Illness ([place]) as above.     Current Outpatient Prescriptions on File Prior to Visit:  diphenhydrAMINE (BENADRYL) 25 MG tablet Take 1-2 tablets (25-50 mg) by mouth every 6 hours as needed for itching or allergies   zolpidem (AMBIEN) 5 MG tablet Take 1 tablet (5 mg) by mouth nightly as needed for sleep   pyridostigmine (MESTINON) 60 MG tablet Take 1 tablet (60 mg) by mouth 3 times daily   lisinopril (PRINIVIL/ZESTRIL) 2.5 MG tablet Take 1 tablet (2.5 mg) by mouth daily   metFORMIN (GLUCOPHAGE) 1000 MG tablet TAKE ONE TABLET BY MOUTH TWICE DAILY WITH MEALS   glipiZIDE (GLUCOTROL) 10 MG tablet Medication is being filled for 1 time refill only due to:  Patient needs to be seen because needs recheck.   simvastatin (ZOCOR) 40 MG tablet Take 1 tablet (40 mg) by mouth At Bedtime   order for DME Equipment being ordered: Adjustable Lumbar support/brace for chronic low back pain   oxyCODONE-acetaminophen (PERCOCET) 5-325 MG per tablet Take 1 tablet by mouth every 6 hours as needed   oxybutynin (DITROPAN-XL) 10 MG 24 hr tablet Take 1 tablet (10 mg) by mouth daily   Chlorpheniramine-DM (CORICIDIN HBP COUGH/COLD PO) Take 1 tablet by mouth daily   albuterol (PROAIR HFA, PROVENTIL HFA, VENTOLIN HFA) 108 (90 BASE) MCG/ACT inhaler Inhale 2 puffs into the lungs every 6 hours   budesonide-formoterol (SYMBICORT) 80-4.5 MCG/ACT inhaler Inhale 2 puffs into the lungs 2 times daily   blood glucose test strip Check three times a day home blood glucose monitoring. Due to fluctuating blood glucose readings , and some hypoglycemia   Cholecalciferol (VITAMIN D) 2000 UNITS CAPS    Blood Glucose Calibration (ACCU-CHEK ABHISHEK) SOLN 1 Bottle by In Vitro route See Admin Instructions   ACCU-CHEK MULTICLIX LANCETS MISC Check home blood  "glucose monitering 2 times a day   aspirin 81 MG tablet Take 1 tablet by mouth daily.   VITAMIN B-12 1000 MCG OR TABS 1 tablet daily     Current Facility-Administered Medications on File Prior to Visit:  sodium chloride (PF) 0.9% PF flush 60 mL     Past Medical History   Diagnosis Date     Obesity      Hyperlipidemia LDL goal <100      Smoking      Quit smoking in 2016     Neuropathy in diabetes (H)      Small cell carcinoma of lung (H) 1/2008     Follows with dr Alice Uribe at [facility]     Fatigue      Hemoptysis      Vitamin B12 deficiency      He does not take Vitamin B12 supplement     Type 2 diabetes, HbA1c goal < 7% (H) 10/2/2010     No past surgical history on file.  Social History     Social History     Marital Status:      Spouse Name: N/A     Number of Children: 2     Years of Education: 12     Occupational History     Retired      Retired in 2008. .      Social History Main Topics     Smoking status: Former Smoker -- 10 years     Types: Cigarettes     Quit date: 08/04/2012     Smokeless tobacco: Never Used      Comment: Quit \"about 5-6 months ago\" (7/27/15)     Alcohol Use: No     Drug Use: No     Sexual Activity:     Partners: Female     Other Topics Concern     None     Social History Narrative       This document serves as a record of the services and decisions personally performed and made by Alexi Dooley MD. It was created on his behalf by Leola Mansfield, a trained medical scribe. The creation of this document is based the provider's statements to the medical scribe.  Jeremy Mansfield 1/23/2017    GENERAL EXAMINATION:  General appearance: Pleasant male sitting comfortably in a chair  Vitals: /66 mmHg  Pulse 106  Ht 1.803 m (5' 11\")  Wt 103.42 kg (228 lb)  BMI 31.81 kg/m2  BMI= Body mass index is 31.81 kg/(m^2).    Head & Neck:  Neck supple  No carotid bruit.   Chest: right anterior chest port in situ.     NEUROLOGICAL EXAMINATION: "   Mental Status:    Alert and oriented to time, place and person    Recent and remote memory intact    Attention span and concentration normal    Adequate fund of knowledge    Speech: Normal    Cranial Nerves:  Cranial Nerve 2:    Visual acuity normal to finger counting    Pupils equal and reacting to light    No field defect by confrontation    Fundus reveals normal disc margins  Cranial Nerves 3, 4 and 6:    Eye movements normal in all directions of gaze  Cranial Nerve 5:     Normal facial sensory and motor functions  Cranial Nerve 7:     Symmetrical face without motor weakness   Cranial Nerve 8:    Right hearing aid in situ.   Cranial Nerves 9, 10:    Normal palate and uvula movements  Cranial Nerve 11:    Shoulder shrug symmetrical  Cranial Nerve 12:    Tongue midline with normal movements    Motor:    Tone and bulk: Normal in both upper and lower limbs. He could arise from the chair with arms folded across his chest without significant difficulty.     Power: No drift of the outstretched arms          Normal strength in all muscle groups of both upper and lower limbs   Coordination:    Finger nose test normal bilaterally    Heel-shin test normal bilaterally  Deep Tendon Reflexes:    Upper limbs: Equal and symmetrical    Lower limbs: Equal and symmetrical with absent ankle jerks    Down going plantars  Sensations:    Touch/Pin prick: Normal at both and upper and lower limbs    Vibration (128 Hz): Diminished at both ankles    Position sense: Normal at both big toes  Gait:    Walks with a slightly stooped posture.     Can stand on heels and toes    Can walk on heels and toes    Minimally unsteady tandem walking  Romberg Sign: Negative    IMPRESSION:   Encounter Diagnoses   Name Primary?     Lambert-Eaton myasthenic syndrome (H) Yes     History of lung cancer      Diabetic peripheral neuropathy (H)      Dysphagia, unspecified type      COMMENTS:Agree with Mandeep Pardo MD that he is likely to have Lambert-Eaton  syndrome because of the previous and recent history of lung cancer. He has positive Acetylcholine receptor and striational antibodies. He mentioned about difficulty swallowing, but he is not consistent about this particular symptom. After swallowing testing by speech therapist, Mandeep Pardo MD may consider seeking the help of GI specialist for increasing concerns regarding dysphagia. I may add that PYRIDOSTIGMINE is given in patients with Eaton-Lambert syndrome also. In addition to it, there is always the possibility of having specific treatment of intravenous immunoglobulins. He needs the help of a neuromuscular specialist to evaluate the issues of myasthenia gravis vs Eaton-Lambert syndrome. They may do electrodiagnostic studies for further evaluation. It was explained to him and his sister that detailed evaluation with a multidisciplinary approach with involvement of specialists from different specialities - neuromuscular, GI, chest, oncologist and surgical oncologist would be needed. Understandably, he is not keen to go to multiple specialists. His accompanying sister has been explaining to him the importance of having multidisciplinary approach to this complex and uncommon problem. It was also indicated to him that he has multiple co morbidities and they include previous COPD, lung cancer twice, and diabetes mellitus along with peripheral neuropathy.     Appreciated his sister's help.     PLAN/ RECOMMENDATIONS:   Patient Instructions     AFTER VISIT SUMMARY (AVS)  Orders Placed This Encounter   Procedures     SPEECH THERAPY REFERRAL     NEUROLOGY ADULT REFERRAL     Diagnostic possibilities reviewed    Advised to have regular follow up with Mandeep Pardo MD for coordinating his care.     Encouraged to cut his food into small pieces.     To call us for follow-up appointment on as needed basis    Thanks to Mandeep Pardo MD for allowing me to participate in Al Mirza's care. Please feel free to contact  me if you have any questions or concerns.    Time with patient 80 minutes, greater than 50% of which was counseling and coordination of care.    Alexi Dooley MD, [facility]  Neurologist      Cc: Mandeep Pardo MD

## 2017-01-23 NOTE — TELEPHONE ENCOUNTER
Reason for Call:  Other     Detailed comments: Patient request a call back to discuss his visit with the specialist to whom he was referred.    Phone Number Patient can be reached at: Home number on file [phone] (home)    Best Time: anytime    Can we leave a detailed message on this number? YES    Call taken on 1/23/2017 at 1:40 PM by Juana Griffith

## 2017-01-24 NOTE — TELEPHONE ENCOUNTER
Copy of 1-18-17, office note with Dr. Alice Uribe given to Dr. Pardo as requested. Halina Preciado,

## 2017-01-24 NOTE — TELEPHONE ENCOUNTER
We had the most recent previous office visit notes from office visit with Dr. Alice Uribe with [facility] last week. Please return this to me so I can call patient       Mandeep Pardo MD

## 2017-01-24 NOTE — TELEPHONE ENCOUNTER
I need to talk with Dr. Alice Uribe with [facility] before I can call this patient back    Mandeep Pardo MD

## 2017-01-24 NOTE — TELEPHONE ENCOUNTER
Called [facility] ([phone]).  Dr. Alice Uribe is out of the office this whole week.  Will route to Dr. Pardo to advise. Halina Preciado,

## 2017-01-24 NOTE — TELEPHONE ENCOUNTER
"Patient stated that Dr. Pardo referred him to Neurology.  He saw DR. Dooley today and felt \"it was a waste of time. He didn't do anything for me.\"  Noted that Dr. Dooley placed a referral for [facility] and speech therapy.    Phone numbers from referral given to patient.  He would like Dr. Pardo to review Dr. Dooley's OV notes.  Patient would like to know \"what does DR. Pardo think of all this?\"    See separate refill request for pyridostigmine (MESTINON) 60 MG tablet     Hayley Gatica RN    "

## 2017-01-27 NOTE — TELEPHONE ENCOUNTER
I successfully reached patient and we reviewed his concerns. He's met with the thoracic surgeon already and is apparently not felt to be a suitable candidate for a partial lung resection and so he's scheduled to see Dr. Campbell from radiation oncologist and to be treated with palliative radiation therapy although   Patient hopes for a cure I am not so sure this term has been rendered by his caregivers.    I remain available to help as much as possible with evenings as they arise    Mandeep Pardo MD

## 2017-02-14 NOTE — PROGRESS NOTES
[facility]          OUTPATIENT SWALLOW  EVALUATION  PLAN OF TREATMENT FOR OUTPATIENT REHABILITATION  (COMPLETE FOR INITIAL CLAIMS ONLY)  Patient's Last Name, First Name, M.I.  YOB: 1955  Al Mirza     Provider's Name   [facility]   Medical Record No.  [id number]     Start of Care Date:  02/14/17   Onset Date:  12/10/16   Type:     ___PT   ____OT  ___X_SLP Medical Diagnosis:     dysphagia   Treatment Diagnosis:  moderate-severe oral-pharyngeal dysphagia with variable swallow function due to mysathenia gravis Visits from SOC:  1     _________________________________________________________________________________  Plan of Treatment/Functional Goals:                           Goals   1. Goal Identifier: STG 1       Goal Description: 1.  Family/patent will verbalize understanding of oral-pharyngeal dysphagia, diet modification and use of precautions/strategies to attempt to increase intake/decrease aspiration risk, and current nutrition/hydration risk and aspiration risk.       Target Date: 02/14/17       Date Met: 02/14/17 (Family verbalized understanding)              Therapy Frequency:  (1x at [facility])  Predicted Duration of Therapy Intervention (days/wks):  (1x at [facility] - recommend clinical OP SLP swallow Tx follow up)    Nicole Bella, SLP       I CERTIFY THE NEED FOR THESE SERVICES FURNISHED UNDER        THIS PLAN OF TREATMENT AND WHILE UNDER MY CARE     (Physician co-signature of this document indicates review and certification of the therapy plan).                               Referring Physician: Dr. Dooley    Initial Assessment        See Epic Evaluation Start Of Care Date: 02/14/17

## 2017-02-14 NOTE — LETTER
Al ESQUIVEL Marleyvero  [address]   [place] [address]    February 21, 2017      Dear Mr. Mirza,    Your video swallowing test shows a minor finding of aspiration once. GI evaluation through Mandeep Pardo MD would be helpful for further review.   Please schedule a follow-up appointment ([phone])  if you would like to discuss the results further and or have any other questions/concerns.     Results for orders placed or performed during the hospital encounter of 02/14/17   XR Video Swallow w/o Esophagram    Narrative    VIDEO SWALLOWING EVALUATION   2/14/2017 2:25 PM     HISTORY: Dysphagia, myasthenia gravis, lung cancer. Possible mass by  esophagus.    COMPARISON: None.    FLUOROSCOPY TIME: 2.4 minutes.  SPOT IMAGES OR CINE RUNS: 10    FINDINGS:    Thin: Silent aspiration at least once and then cough after a second  episode of aspiration. Mild residue.    Nectar: Mild residue in piriform sinuses. Flash penetration of the  vestibule.    Honey: Not administered.    Pudding: Moderate residue in the valleculae and piriform sinuses.    Semisolid: Mild residue in the piriform sinuses.    Solid: Not administered.    This study only includes the cervical esophagus.    BRANDON MELÉNDEZ MD         Thanks.    Sincerely,    Alexi Dooley MD, MRCPI  Neurologist

## 2017-02-14 NOTE — PROGRESS NOTES
This is a chart note addendum ; I had a personal telephone call with Dr. Campbell from radiation oncology about the status with Mr. Al Mirza. Dr. Campbell was simply seeking to keep me in the information loop . As it turns out the prognosis for this patient is increasingly dire and he's apparently not a candidate for a curative surgery, he lacks sufficient pulmonary reserve capacity to handle further partial lung resections and as such is only a candidate for palliative radiation therapy. But even with this there was significant worry on the part of Dr. Campbell that radiation might result in serious dysphagia as the tumor abuts the esophagus which by the way would put this at stage 4 cancer.    I listened to all issues. I remain available to help with any and all issues as they arise,    Mandeep Pardo MD

## 2017-02-14 NOTE — PROGRESS NOTES
02/14/17 1646   General Information   Type Of Visit Initial   Start Of Care Date 02/14/17   Referring Physician Dr. Dooley   Medical Diagnosis Dysphagia   Onset Of Illness/injury Or Date Of Surgery 12/10/16   Hearing Hearing aids; Decreased   General Observations Alert  (Mild-minimal decreased articulation noted)   Patient/family Goals To be determined.  Patient and his sister to clarify the amount of diet modication and whether or not to continue po intake if increased signs of aspiration are observed despite precautions.  Patient states no PEG/G tube wishes despite weight loss and difficulty swallowing.  Recommend MD/family/patient discussion regarding his wishes with known compromised swallow and nutrition status.   General Information Comments Patient's sister was present to provide background information with patient.  Sister reports patient had abrupt onset of dysarthria on 12/10/16 when mysathenia gravis was diagnosed.  Family reports medical treatment temporarily helped speech function, but that dysarthria and dysphagia symptoms have continued intermittently in severity since that time.  Family reports patient's dysphagia has increased over the last 3-4 weeks with difficulty swallowing liquids, puree, solids, and pills.  Patient desribes a feeling that food/liquids hit a certain point and do not pass down.  He then vomits/spits out everything he ate or drank back up.  Patient does not report direct coughing episodes.  Family does report patient coughs up phlegm at times.  Patient has also had a 35-40 pound weight loss over the past 3-4 weeks.  New lung masses were recently diagnosed with last mass found on 1/12/17 per family report.  Radiation Tx is planned starting 2/17/17.  Notes state radiation is palliative and patient is not a surgical candidate.  Family reports recent dehydration episodes x 2.  Patient attempts to drink supplement shakes; however, he has difficulty swallowing shakes as well.   FALL  RISK SCREEN   Comments See radiology staff documentation.   Clinical Swallow Evaluation   Oral Labial Strength and Mobility (intact overall)   Lingual Strength and Mobility impaired left lateral movement;impaired right lateral movement  (mild decreased ROM and strength)   VFSS Eval: Radiology   Radiologist Dr. Pryor   Views Taken left lateral   Physical Location of Procedure [facility]   VFSS Eval: Thin Liquid Texture Trial   Mode of Presentation, Thin Liquid cup;straw   Order of Presentation 1, 2, 3, 4   Preparatory Phase Poor bolus control   Oral Phase, Thin Liquid Residue in oral cavity;Premature pharyngeal entry   Pharyngeal Phase, Thin Liquid Delayed swallow reflex   Rosenbek's Penetration Aspiration Scale: Thin Liquid Trial Results 8 - contrast passes glottis, visible subglottic residue remains, absent patient response (aspiration)   Diagnostic Statement Weak base of tongue function, decreased peristalsis, mild-moderate decreased epiglottic inversion, delay the the pyriform sinuses, mild-moderate to moderate pharyngeal residue, 3 swallows clears to minimal levels, flash penetration on recording review trial 2 by cup, repeated penetration to the vocal folds/silent aspiration trial 3 by straw, progressive increased delay and decreased epiglottic inversion with increased residue and repeated penetration to the vocal folds/silent aspiration trial 4 by cup with slight cough noted   VFSS Eval: Nectar Thick Liquid Texture Trial   Mode of Presentation, Nectar spoon;cup   Order of Presentation 5, 6, 8, 10   Preparatory Phase Poor bolus control   Oral Phase, Nectar Premature pharyngeal entry;Residue in oral cavity   Pharyngeal Phase, Nectar Delayed swallow reflex   Rosenbek's Penetration Aspiration Scale: Nectar-Thick Liquid Trial Results 1 - no aspiration, contrast does not enter airway  (trace slight penetration last trial by spoon)   Diagnostic Statement Weak base of tongue function, decreased peristalsis, mild-moderate  to moderate decreased epiglottic inversion, delay to the pyriform sinuses at times, mild-moderate pharyngeal residue, difficult for patient to initiate 2nd swallows with only partial clearing noted, increased delay noted trial 5, slight trace penetration noted with trial 10   VFSS Eval: Honey Thick Texture Trial   Order of Presentation Did not test given number of trials and performance during the study   VFSS Eval: Puree Solid Texture Trial   Mode of Presentation, Puree spoon   Order of Presentation 7   Preparatory Phase Poor bolus control   Oral Phase, Puree Poor AP movement;Residue in oral cavity;Premature pharyngeal entry   Pharyngeal Phase, Puree Delayed swallow reflex   Rosenbek's Penetration Aspiration Scale: Puree Food Trial Results 1 - no aspiration, contrast does not enter airway   Diagnostic Statement Weak base of tongue function, decreased peristalsis, moderate decreased epiglottic inversion, delay, moderate pharyngeal residue, difficult for patient to initiate 2nd swallow, residue remained after swallow, alternating to nectar only cleared small amounts   VFSS Eval: Semisolid Texture Trial   Mode of Presentation, Semisolid spoon   Order of Presentation 9   Preparatory Phase Poor bolus control   Oral Phase, Semisolid Poor AP movement;Residue in oral cavity;Premature pharyngeal entry   Pharyngeal Phase, Semisolid Delayed swallow reflex   Rosenbek's Penetration Aspiration Scale: Semisolid Food Trial Results 1 - no aspiration, contrast does not enter airway   Diagnostic Statement Weak base of tongue function, decreased peristalsis, mild-moderate decreased epiglottic inversion, delay, mild pharyngeal residue, alternating to nectar only cleared small amounts   VFSS Eval: Solid Food Texture Trial   Order of Presentation Did not test due to pharyngeal residue with other trials   Educational Assessment   Barriers to Learning Hearing   Preferred Learning Style Demonstration;Reading;Pictures/video   Esophageal  Phase of Swallow   Patient reports or presents with symptoms of esophageal dysphagia Yes   Esophageal sweep performed during today s vidofluoroscopic exam  Yes   Esophageal comments Brief pan down showed residual barium; Radiologist unable to evaluate esophagus during this study.  No esophagram has been ordered at this time.   Swallow Eval: Clinical Impressions   Skilled Criteria for Therapy Intervention Skilled criteria met.  Treatment indicated.   Functional Assessment Scale (FAS) 2   Dysphagia Outcome Severity Scale (NAYLA) Level 2 - NAYLA   Treatment Diagnosis moderate-severe oral-pharyngeal dysphagia with variable swallow function due to mysathenia gravis   Diet texture recommendations Dysphagia diet level 1;Nectar thick liquids  (safest options to trial with precautions; diet/liquids to be clarified per patient wishes)   Recommended Feeding/Eating Techniques (see below)   Rehab Potential fair, will monitor progress closely   Therapy Frequency (1x at [facility])   Predicted Duration of Therapy Intervention (days/wks) (1x at [facility] - recommend clinical OP SLP swallow Tx follow up)   Anticipated Discharge Disposition home w/ outpatient services   Risks and Benefits of Treatment have been explained. Yes   Patient, family and/or staff in agreement with Plan of Care Yes   Clinical Impression Comments Patient presents with moderate-severe oral-pharyngeal dysphagia with variable swallow function due to mysathenia gravis.  Deficits include decreased lingual function, poor AP movement, weak base of tongue function, decreased peristalsis, variable decreased epiglottic inversion, variable swallow delay, and variable initiation of swallows.  Deficits resulted in mild to moderate pharyngeal residue, slight trace penetration with nectar by spoon x 1, and repeated silent penetration to the vocal folds/silent aspiration with thin by cup x 1 and by straw.  Patient had difficulty initiating swallows at times with progressive increased delay  and decreased epiglottic inversion at times.  Patient's variable level of weakness with swallowing places him at borderline safety for po intake.  Patient indicated he does not want a tube feeding at this time.  Safest po option at this time is nectar thick liquids by spoon and pureed food with precautions/strategies in attempt to decrease residue/aspiration risk.  Strategies/precautions include: sit up at 90 degres, remain upright for 60 minutes after eating, small bites/sips, 2-3 hard multiple swallows per trial, alternate textures, small meals, crush pills and take with puree or nectar thick.  Family/patient stated he may not want to follow the diet/liquid modication to this extent.  Dysphagia diet level 2 textures were recommended as the next diet above puree; however, increased pharygneal residue/aspiration risk education was provided.  Patient is unlikely to meet his nutritional needs given his current swallowing deficits.  Recommend MD/patient/family discussion regarding his wishes for PEG/G tube in addition to small amounts of po for pleasure vs to continue po intake alone despite borderline safety and nutrition/hydration risk.  Recommend clinical OP swallow Tx follow up to provide additional education and training of precautions.  Recommend discussion regarding patient's overall POC with primary MD.  Suspect esophageal dysphagia in addition to oral-pharyngeal deficits.  Patient is at risk for aspirating during an esophagram at this time.  Consider GI MD consult (rather than esophagram) to evaluate esophageal function as felt indicated by primary MD.  Extensive swallow Tx with education was provided after today's study.  Patient/family indicated if they pursue clinical OP swallow Tx follow up they will look for a clinic closer to where patient lives.   Swallow Goal 1   Goal Identifier STG 1   Goal Description 1.  Family/patent will verbalize understanding of oral-pharyngeal dysphagia, diet modification and  use of precautions/strategies to attempt to increase intake/decrease aspiration risk, and current nutrition/hydration risk and aspiration risk.   Target Date 02/14/17   Date Met 02/14/17  (Family verbalized understanding)   Total Session Time   Total Session Time 1:30pm-2:15pm (45 minutes) video swallow study; 2:15pm-3:00pm (45 minutes) swallow Tx

## 2017-02-22 NOTE — TELEPHONE ENCOUNTER
pyridostigmine (MESTINON) 60 MG       Last Written Prescription Date:  01/23/17  Last Fill Quantity: 90,   # refills: 0  Last Office Visit with [facility], [facility] or [facility] prescribing provider: 01/09/17  Future Office visit:       Routing refill request to provider for review/approval because:  Drug not on the [facility], [facility] or [facility] refill protocol or controlled substance

## 2017-02-27 NOTE — TELEPHONE ENCOUNTER
metFORMIN (GLUCOPHAGE) 1000 MG tablet         Last Written Prescription Date: 11/21/16  Last Fill Quantity: 180, # refills: 0  Last Office Visit with [facility], [facility] or [facility] prescribing provider:  1/9/17        BP Readings from Last 3 Encounters:   01/23/17 123/66   01/09/17 102/60   04/19/16 128/82     Lab Results   Component Value Date    MICROL 96 07/27/2015     No results found for: MICROALBUMIN  Creatinine   Date Value Ref Range Status   04/19/2016 0.92 0.66 - 1.25 mg/dL Final   ]  GFR Estimate   Date Value Ref Range Status   04/19/2016 83 >60 mL/min/1.7m2 Final     Comment:     Non  GFR Calc   07/27/2015 79 >60 mL/min/1.7m2 Final     Comment:     Non  GFR Calc   01/15/2015 >60 ml/min/1.73m2 Final     GFR Estimate If Black   Date Value Ref Range Status   04/19/2016 >90   GFR Calc   >60 mL/min/1.7m2 Final   07/27/2015 >90   GFR Calc   >60 mL/min/1.7m2 Final   01/15/2015 >60 ml/min/1.73m2 Final     Lab Results   Component Value Date    CHOL 147 01/08/2015     Lab Results   Component Value Date    HDL 30 01/08/2015     Lab Results   Component Value Date    LDL 76 04/19/2016    LDL 83 01/08/2015     Lab Results   Component Value Date    TRIG 171 01/08/2015     Lab Results   Component Value Date    CHOLHDLRATIO 4.9 01/08/2015     Lab Results   Component Value Date    AST 16 01/08/2015     Lab Results   Component Value Date    ALT 20 04/29/2014     Lab Results   Component Value Date    A1C 6.2 01/09/2017    A1C 7.0 04/19/2016    A1C 7.1 07/27/2015    A1C 7.0 01/08/2015    A1C 7.1 04/29/2014     Potassium   Date Value Ref Range Status   04/19/2016 4.8 3.4 - 5.3 mmol/L Final

## 2017-02-28 NOTE — TELEPHONE ENCOUNTER
Prescription approved per [facility] Refill Protocol.    Signed Prescriptions:                        Disp   Refills    metFORMIN (GLUCOPHAGE) 1000 MG tablet      180 ta*1        Sig: TAKE 1 TABLET BY MOUTH TWICE DAILY WITH MEALS  Authorizing Provider: EITAN POLANCO  Ordering User: LAUREN MALIK, RN, BSN

## 2017-03-01 NOTE — TELEPHONE ENCOUNTER
Duplicate. Called and verified with pharmacy.        Medication Detail         Disp Refills Start End HEATH     pyridostigmine (MESTINON) 60 MG tablet 90 tablet 0 2/22/2017  No     Sig: TAKE 1 TABLET (60 MG) BY MOUTH 3 TIMES DAILY     Class: E-Prescribe     Order: [id number]     E-Prescribing Status: Receipt confirmed by pharmacy (2/22/2017  2:22 PM CST)       Printout Tracking      External Result Report       Pharmacy      [id number] - [place], [place] - [address]     Silvina Geiger MA